# Patient Record
Sex: FEMALE | HISPANIC OR LATINO
[De-identification: names, ages, dates, MRNs, and addresses within clinical notes are randomized per-mention and may not be internally consistent; named-entity substitution may affect disease eponyms.]

---

## 2023-05-15 PROBLEM — Z00.00 ENCOUNTER FOR PREVENTIVE HEALTH EXAMINATION: Status: ACTIVE | Noted: 2023-05-15

## 2023-05-22 ENCOUNTER — APPOINTMENT (OUTPATIENT)
Dept: PEDIATRIC ORTHOPEDIC SURGERY | Facility: CLINIC | Age: 48
End: 2023-05-22
Payer: COMMERCIAL

## 2023-05-22 VITALS
DIASTOLIC BLOOD PRESSURE: 70 MMHG | BODY MASS INDEX: 29.43 KG/M2 | SYSTOLIC BLOOD PRESSURE: 125 MMHG | HEIGHT: 59 IN | WEIGHT: 146 LBS | TEMPERATURE: 96.8 F

## 2023-05-22 DIAGNOSIS — Z83.3 FAMILY HISTORY OF DIABETES MELLITUS: ICD-10-CM

## 2023-05-22 PROCEDURE — 73030 X-RAY EXAM OF SHOULDER: CPT

## 2023-05-22 PROCEDURE — 20610 DRAIN/INJ JOINT/BURSA W/O US: CPT

## 2023-05-22 PROCEDURE — 99203 OFFICE O/P NEW LOW 30 MIN: CPT | Mod: 25

## 2023-05-22 RX ORDER — MELOXICAM 15 MG/1
15 TABLET ORAL
Qty: 30 | Refills: 1 | Status: ACTIVE | COMMUNITY
Start: 2023-05-22 | End: 1900-01-01

## 2023-05-22 NOTE — PHYSICAL EXAM
[de-identified] : Examination today reveals this patient to have good motion to the cervical spine without spasm or tenderness.  The right shoulder has no visible inflammatory changes or atrophy she has moderate restriction of full forward flexion abduction in scapular plane as well as restriction of rotation both in/out.  There is no evidence of instability on stress no clicking or popping on either active or passive motion.  Her strength is restricted at least on the basis of pain.  Positive Neer and Hernandez negative drop arm\par \par X-rays ordered and taken today of the right shoulder reveal degenerative changes present about the greater tuberosity with mild spur formation to the subacromial space

## 2023-05-22 NOTE — PROCEDURE
[FreeTextEntry1] : Under sterile technique with a Betadine prep the subacromial space of the right shoulder was injected with 40 mg of methylprednisolone and 5 cc of 1% lidocaine with a Band-Aid dressing applied at the conclusion this was tolerated without incident

## 2023-05-22 NOTE — HISTORY OF PRESENT ILLNESS
[de-identified] : This 48-year-old right-handed healthy young lady is seen today for evaluation of the right shoulder.  She has had a 1 year history of pain and stiffness and restricted motion.  She states that she was holding a heavy bottle of soda if fell behind her and this seemed to have injured her shoulder.  This is again on the order of 1 year ago.  She has not had any neck pain numbness or paresthesias.  No clicking or popping or sensation of instability to the shoulder.  Prior to this no complaints of past history is noncontributory

## 2023-05-22 NOTE — ASSESSMENT
[FreeTextEntry1] : Impression: Strain right rotator cuff.\par \par I have injected the subacromial space.  She has been placed on Mobic with GI precaution and physical therapy has been ordered.  She will return in approximately 5 weeks for repeat evaluation

## 2023-06-26 ENCOUNTER — APPOINTMENT (OUTPATIENT)
Dept: PEDIATRIC ORTHOPEDIC SURGERY | Facility: CLINIC | Age: 48
End: 2023-06-26
Payer: COMMERCIAL

## 2023-06-26 VITALS — TEMPERATURE: 97.6 F | BODY MASS INDEX: 29.43 KG/M2 | HEIGHT: 59 IN | WEIGHT: 146 LBS

## 2023-06-26 DIAGNOSIS — S46.011A STRAIN OF MUSCLE(S) AND TENDON(S) OF THE ROTATOR CUFF OF RIGHT SHOULDER, INITIAL ENCOUNTER: ICD-10-CM

## 2023-06-26 PROCEDURE — 73030 X-RAY EXAM OF SHOULDER: CPT

## 2023-06-26 PROCEDURE — 99212 OFFICE O/P EST SF 10 MIN: CPT

## 2023-06-26 RX ORDER — MELOXICAM 15 MG/1
15 TABLET ORAL
Qty: 30 | Refills: 1 | Status: ACTIVE | COMMUNITY
Start: 2023-06-26 | End: 1900-01-01

## 2023-06-26 NOTE — PHYSICAL EXAM
[de-identified] : Her exam today reveals she has restricted motion as she has pain from the recent fall she is tender about the proximal humerus she is able to forward flex and abduct to just above the horizontal plane.  Passive motion is better though it is with discomfort.  There is no evidence of instability.\par \par X-rays ordered and taken today to rule out fracture of the right shoulder were negative for acute injury

## 2023-06-26 NOTE — ASSESSMENT
[FreeTextEntry1] : Impression: Strain right rotator cuff.\par \par This patient will be treated with range of motion at home along with Mobic and will return as necessary.

## 2023-06-26 NOTE — HISTORY OF PRESENT ILLNESS
[de-identified] : This 48-year-old returns today she had been doing very well following the injection to the right shoulder.  Unfortunately this past weekend she fell injuring her right shoulder this has caused pain with mild swelling.